# Patient Record
Sex: FEMALE | Race: WHITE | Employment: UNEMPLOYED | URBAN - METROPOLITAN AREA
[De-identification: names, ages, dates, MRNs, and addresses within clinical notes are randomized per-mention and may not be internally consistent; named-entity substitution may affect disease eponyms.]

---

## 2022-12-07 ENCOUNTER — HOSPITAL ENCOUNTER (EMERGENCY)
Age: 22
Discharge: HOME | End: 2022-12-07
Attending: EMERGENCY MEDICINE
Payer: COMMERCIAL

## 2022-12-07 VITALS
WEIGHT: 143 LBS | HEART RATE: 112 BPM | DIASTOLIC BLOOD PRESSURE: 95 MMHG | OXYGEN SATURATION: 98 % | TEMPERATURE: 99.3 F | HEIGHT: 62 IN | SYSTOLIC BLOOD PRESSURE: 121 MMHG | BODY MASS INDEX: 26.31 KG/M2 | RESPIRATION RATE: 16 BRPM

## 2022-12-07 DIAGNOSIS — T40.601A OPIATE OVERDOSE, ACCIDENTAL OR UNINTENTIONAL, INITIAL ENCOUNTER (HCC): Primary | ICD-10-CM

## 2022-12-07 LAB
ANION GAP SERPL CALCULATED.3IONS-SCNC: 13 MMOL/L (ref 3–16)
BASOPHILS ABSOLUTE: 0 K/UL (ref 0–0.2)
BASOPHILS RELATIVE PERCENT: 0.3 %
BUN BLDV-MCNC: 9 MG/DL (ref 7–20)
CALCIUM SERPL-MCNC: 10 MG/DL (ref 8.3–10.6)
CHLORIDE BLD-SCNC: 101 MMOL/L (ref 99–110)
CO2: 24 MMOL/L (ref 21–32)
CREAT SERPL-MCNC: 0.6 MG/DL (ref 0.6–1.1)
EOSINOPHILS ABSOLUTE: 0 K/UL (ref 0–0.6)
EOSINOPHILS RELATIVE PERCENT: 0.2 %
GFR SERPL CREATININE-BSD FRML MDRD: >60 ML/MIN/{1.73_M2}
GLUCOSE BLD-MCNC: 112 MG/DL (ref 70–99)
HCT VFR BLD CALC: 36.7 % (ref 36–48)
HEMOGLOBIN: 12.4 G/DL (ref 12–16)
LYMPHOCYTES ABSOLUTE: 2.1 K/UL (ref 1–5.1)
LYMPHOCYTES RELATIVE PERCENT: 22.4 %
MAGNESIUM: 2 MG/DL (ref 1.8–2.4)
MCH RBC QN AUTO: 29.6 PG (ref 26–34)
MCHC RBC AUTO-ENTMCNC: 33.7 G/DL (ref 31–36)
MCV RBC AUTO: 87.8 FL (ref 80–100)
MONOCYTES ABSOLUTE: 0.8 K/UL (ref 0–1.3)
MONOCYTES RELATIVE PERCENT: 8.8 %
NEUTROPHILS ABSOLUTE: 6.5 K/UL (ref 1.7–7.7)
NEUTROPHILS RELATIVE PERCENT: 68.3 %
PDW BLD-RTO: 15.2 % (ref 12.4–15.4)
PLATELET # BLD: 341 K/UL (ref 135–450)
PMV BLD AUTO: 8.1 FL (ref 5–10.5)
POTASSIUM REFLEX MAGNESIUM: 3.4 MMOL/L (ref 3.5–5.1)
RBC # BLD: 4.18 M/UL (ref 4–5.2)
SODIUM BLD-SCNC: 138 MMOL/L (ref 136–145)
WBC # BLD: 9.5 K/UL (ref 4–11)

## 2022-12-07 PROCEDURE — 2580000003 HC RX 258: Performed by: EMERGENCY MEDICINE

## 2022-12-07 PROCEDURE — 99284 EMERGENCY DEPT VISIT MOD MDM: CPT

## 2022-12-07 PROCEDURE — 36415 COLL VENOUS BLD VENIPUNCTURE: CPT

## 2022-12-07 PROCEDURE — 85025 COMPLETE CBC W/AUTO DIFF WBC: CPT

## 2022-12-07 PROCEDURE — 80048 BASIC METABOLIC PNL TOTAL CA: CPT

## 2022-12-07 PROCEDURE — 83735 ASSAY OF MAGNESIUM: CPT

## 2022-12-07 RX ORDER — 0.9 % SODIUM CHLORIDE 0.9 %
1000 INTRAVENOUS SOLUTION INTRAVENOUS ONCE
Status: COMPLETED | OUTPATIENT
Start: 2022-12-07 | End: 2022-12-07

## 2022-12-07 RX ADMIN — SODIUM CHLORIDE 1000 ML: 9 INJECTION, SOLUTION INTRAVENOUS at 16:04

## 2022-12-07 RX ADMIN — SODIUM CHLORIDE 1000 ML: 9 INJECTION, SOLUTION INTRAVENOUS at 18:06

## 2022-12-07 ASSESSMENT — PAIN - FUNCTIONAL ASSESSMENT: PAIN_FUNCTIONAL_ASSESSMENT: NONE - DENIES PAIN

## 2022-12-07 NOTE — ED NOTES
Writer received call from [employer] to receive information regarding patient. [place] stated that he had a release of information form and was to fax it over. Release of information form was not completely filled out and patient stated that she did not consent for any information to be given at this time. Release was meant for \"the [facility]\" facility only.  No information provided to deputy per patient request.     Alebrto Cheatham RN  12/07/22 6837

## 2022-12-07 NOTE — DISCHARGE INSTRUCTIONS
You are being discharged to [facility] for inpatient drug rehab. Your labwork was reassuring. Our heart rate was elevated here, but that is likely from the Methamphetamine you used earlier today. severe

## 2022-12-07 NOTE — ED PROVIDER NOTES
[facility] Emergency Department      CHIEF COMPLAINT  Drug Overdose (Pt was on her way to inpatient rehab and snorted fentanyl on the way. Was in intake and went unresponsive and / lips blue. Was given 2 dosed of 4 mg narcan IN. By the time squad arrived pt was alert and oriented. Pt arrives to ed alert and oriented.  )      HISTORY OF PRESENT ILLNESS  Yaquelin Demarco is a 25 y.o. female with a history of drug abuse presents after an accidental overdose. She states she was in intermediate last week and was court mandated to either do outpatient rehab with house arrest or an inpatient rehab program.  She was checking into the [facility] today for inpatient rehab. She states that she did some meth earlier today and then snorted some fentanyl on her way to the [facility]. While they were doing her intake she apparently became unresponsive and her lips turned blue. She was given 2 doses of 4 mg of intranasal Narcan and woke up and was alert and oriented. She was sent here for further evaluation. She denies any complaints currently. She states she realizes that if she had done this while she was at home that she would have . She states she does want to get sober. .   No other complaints, modifying factors or associated symptoms. I have reviewed the following from the nursing documentation. No past medical history on file. No past surgical history on file. No family history on file.   Social History     Socioeconomic History    Marital status:      Spouse name: Not on file    Number of children: Not on file    Years of education: Not on file    Highest education level: Not on file   Occupational History    Not on file   Tobacco Use    Smoking status: Not on file    Smokeless tobacco: Not on file   Substance and Sexual Activity    Alcohol use: Not on file    Drug use: Yes     Types: Opiates     Sexual activity: Not on file   Other Topics Concern    Not on file   Social History Narrative    Not on file     Social Determinants of Health     Financial Resource Strain: Not on file   Food Insecurity: Not on file   Transportation Needs: Not on file   Physical Activity: Not on file   Stress: Not on file   Social Connections: Not on file   Intimate Partner Violence: Not on file   Housing Stability: Not on file     No current facility-administered medications for this encounter. No current outpatient medications on file. No Known Allergies    REVIEW OF SYSTEMS      General:  No fevers  Eyes:  No recent vison changes  ENT:  No sore throat, no nasal congestion  Cardiovascular:  no palpitations  Respiratory:   no cough, no wheezing  Gastrointestinal:  No abdominal pain, no vomiting, no diarrhea  Musculoskeletal:  No muscle pain, no joint pain  Skin:  No rash   Neurologic:  No speech problems, no headache, no extremity numbness, no extremity weakness  Genitourinary:  No dysuria  Extremities:  no edema, no pain      Unless otherwise stated in this report, this patient's positive and negative responses for review of systems (constitutional, eyes, ENT, cardiovascular, respiratory, gastrointestinal, neurological, genitourinary, musculoskeletal, integument systems and systems related to the presenting problem) are either stated in the preceding paragraph, were not pertinent or were negative for the symptoms and/or complaints related to the medical problem. PHYSICAL EXAM  BP (!) 121/95   Pulse (!) 112   Temp 99.3 °F (37.4 °C) (Oral)   Resp 16   Ht 5' 2\" (1.575 m)   Wt 143 lb (64.9 kg)   SpO2 98%   BMI 26.16 kg/m²   GENERAL APPEARANCE: Awake and alert. Cooperative. No acute distress. HEAD: Normocephalic. Atraumatic. EYES: PERRL. EOM's grossly intact. ENT: Mucous membranes are moist.   NECK: Supple, trachea midline. HEART: Tachycardic, normal rhythm  LUNGS: Respirations unlabored. CTAB. Good air exchange. No wheezes, rales, or rhonchi. Speaking comfortably in full sentences. ABDOMEN: Soft. Non-distended. Non-tender. No guarding or rebound. EXTREMITIES: No peripheral edema. MAEE. No acute deformities. SKIN: Warm, dry and intact. No acute rashes. NEUROLOGICAL: Alert and oriented X 3. CN II-XII grossly intact. PSYCHIATRIC: Normal mood and affect. LABS  I have reviewed all labs for this visit. Results for orders placed or performed during the hospital encounter of 12/07/22   CBC with Auto Differential   Result Value Ref Range    WBC 9.5 4.0 - 11.0 K/uL    RBC 4.18 4.00 - 5.20 M/uL    Hemoglobin 12.4 12.0 - 16.0 g/dL    Hematocrit 36.7 36.0 - 48.0 %    MCV 87.8 80.0 - 100.0 fL    MCH 29.6 26.0 - 34.0 pg    MCHC 33.7 31.0 - 36.0 g/dL    RDW 15.2 12.4 - 15.4 %    Platelets 990 664 - 531 K/uL    MPV 8.1 5.0 - 10.5 fL    Neutrophils % 68.3 %    Lymphocytes % 22.4 %    Monocytes % 8.8 %    Eosinophils % 0.2 %    Basophils % 0.3 %    Neutrophils Absolute 6.5 1.7 - 7.7 K/uL    Lymphocytes Absolute 2.1 1.0 - 5.1 K/uL    Monocytes Absolute 0.8 0.0 - 1.3 K/uL    Eosinophils Absolute 0.0 0.0 - 0.6 K/uL    Basophils Absolute 0.0 0.0 - 0.2 K/uL   Basic Metabolic Panel w/ Reflex to MG   Result Value Ref Range    Sodium 138 136 - 145 mmol/L    Potassium reflex Magnesium 3.4 (L) 3.5 - 5.1 mmol/L    Chloride 101 99 - 110 mmol/L    CO2 24 21 - 32 mmol/L    Anion Gap 13 3 - 16    Glucose 112 (H) 70 - 99 mg/dL    BUN 9 7 - 20 mg/dL    Creatinine 0.6 0.6 - 1.1 mg/dL    Est, Glom Filt Rate >60 >60    Calcium 10.0 8.3 - 10.6 mg/dL   Magnesium   Result Value Ref Range    Magnesium 2.00 1.80 - 2.40 mg/dL       RADIOLOGY  X-RAYS: ALL IMAGES INCLUDING PLAIN FILMS, CT, ULTRASOUND AND MRI HAVE BEEN READ BY THE RADIOLOGIST. I have personally reviewed plain film images and have reviewed the radiology reports. No orders to display              Rechecks: Physical assessment performed. Patient was observed in the ER for over 4 hours. Heart rate did improve after IV fluids.   She remains awake and alert with no rebound somnolence. Sepsis:  Is this patient to be included in the SEP-1 Core Measure due to severe sepsis or septic shock? No   Exclusion criteria - the patient is NOT to be included for SEP-1 Core Measure due to: Alternative explanation for abnormal labs/vitals that do not relate to sepsis, see MDM for further explanation           ED COURSE/MDM  Patient seen and evaluated. Here the patient is afebrile with normal vitals signs, other than sinus tachycardia. Old records reviewed. Here she is asymptomatic. Awake and alert. Lab work all appears to be baseline. She was given IV fluids here and her heart rate did improve however it was still was greater than 100 at the time of discharge. She admits to methamphetamine use earlier this evening and I do suspect this is likely the source of her tachycardia. She is not having any complaints of abdominal pain or dysuria. She is afebrile with no leukocytosis. I do not think this tachycardia is a sign of sepsis. A staff member from the [facility] is here to pick the patient up and is taking her directly there for inpatient rehab. Labs and imaging reviewed and results discussed with patient. Patient was reassessed as noted above . Plan of care discussed with patient. Patient in agreement with plan. Strict return precautions have been given. Patient was given scripts for the following medications. I counseled patient how to take these medications. There are no discharge medications for this patient. No prescriptions given. CLINICAL IMPRESSION  1. Opiate overdose, accidental or unintentional, initial encounter ([facility] [address]        Blood pressure (!) 121/95, pulse (!) 112, temperature 99.3 °F (37.4 °C), temperature source Oral, resp. rate 16, height 5' 2\" (1.575 m), weight 143 lb (64.9 kg), SpO2 98 %. DISPOSITION  Miriam Strauss was discharged to inpatient rehab in stable condition. Shiela Beckham MD am the primary clinician of record.     (Please note this note was completed with a voice recognition program.  Efforts were made to edit the dictations but occasionally words are mis-transcribed.)        Rebecca Corbin MD  12/09/22 2122

## 2022-12-30 ENCOUNTER — HOSPITAL ENCOUNTER (EMERGENCY)
Age: 22
Discharge: HOME | End: 2022-12-30
Payer: COMMERCIAL

## 2022-12-30 VITALS
HEART RATE: 102 BPM | SYSTOLIC BLOOD PRESSURE: 126 MMHG | TEMPERATURE: 96 F | OXYGEN SATURATION: 98 % | DIASTOLIC BLOOD PRESSURE: 92 MMHG | RESPIRATION RATE: 18 BRPM

## 2022-12-30 DIAGNOSIS — R82.71 ASYMPTOMATIC BACTERIURIA: ICD-10-CM

## 2022-12-30 DIAGNOSIS — Z76.89 ENCOUNTER FOR ASSESSMENT OF STD EXPOSURE: Primary | ICD-10-CM

## 2022-12-30 LAB
BACTERIA WET PREP: NORMAL
BACTERIA: ABNORMAL /HPF
BILIRUBIN URINE: NEGATIVE
BLOOD, URINE: NEGATIVE
CLARITY: ABNORMAL
CLUE CELLS: NORMAL
COLOR: YELLOW
EPITHELIAL CELLS WET PREP: NORMAL
EPITHELIAL CELLS, UA: ABNORMAL /HPF (ref 0–5)
GLUCOSE URINE: NEGATIVE MG/DL
HCG(URINE) PREGNANCY TEST: NEGATIVE
KETONES, URINE: NEGATIVE MG/DL
LEUKOCYTE ESTERASE, URINE: ABNORMAL
MICROSCOPIC EXAMINATION: YES
NITRITE, URINE: NEGATIVE
PH UA: 6 (ref 5–8)
PROTEIN UA: NEGATIVE MG/DL
RBC UA: ABNORMAL /HPF (ref 0–4)
RBC WET PREP: NORMAL
SOURCE WET PREP: NORMAL
SPECIFIC GRAVITY UA: 1.02 (ref 1–1.03)
TRICHOMONAS PREP: NORMAL
URINE REFLEX TO CULTURE: YES
URINE TYPE: ABNORMAL
UROBILINOGEN, URINE: 0.2 E.U./DL
WBC UA: ABNORMAL /HPF (ref 0–5)
WBC WET PREP: NORMAL
YEAST WET PREP: NORMAL

## 2022-12-30 PROCEDURE — 84703 CHORIONIC GONADOTROPIN ASSAY: CPT

## 2022-12-30 PROCEDURE — 87591 N.GONORRHOEAE DNA AMP PROB: CPT

## 2022-12-30 PROCEDURE — 87491 CHLMYD TRACH DNA AMP PROBE: CPT

## 2022-12-30 PROCEDURE — 87210 SMEAR WET MOUNT SALINE/INK: CPT

## 2022-12-30 PROCEDURE — 81001 URINALYSIS AUTO W/SCOPE: CPT

## 2022-12-30 PROCEDURE — 99283 EMERGENCY DEPT VISIT LOW MDM: CPT

## 2022-12-30 PROCEDURE — 87086 URINE CULTURE/COLONY COUNT: CPT

## 2022-12-30 ASSESSMENT — PAIN - FUNCTIONAL ASSESSMENT: PAIN_FUNCTIONAL_ASSESSMENT: NONE - DENIES PAIN

## 2022-12-30 NOTE — ED PROVIDER NOTES
[address] ED  EMERGENCY DEPARTMENT ENCOUNTER        Pt Name: Ramona Damon  MRN: [id number]  [place] 2000  Date of evaluation: 12/30/2022  Provider: PATRICIA Cornejo  PCP: No primary care provider on file. Note Started: 5:04 PM EST 12/30/22      CHEY. I have evaluated this patient. My supervising physician was available for consultation. CHIEF COMPLAINT       Chief Complaint   Patient presents with    Exposure to STD     Pt reports she tested positive for syphilis x6 mo ago, pt unsure about results but reports now ex-partner is positive as well. Reports irregular vaginal bleeding, cramping x5 mo. HISTORY OF PRESENT ILLNESS: 1 or more Elements     History from : Patient        Ramona Damon is a 25 y.o. female who presents from her rehab facility for evaluation of exposure to STD. Patient notes that about 6 months ago she was at a needle exchange program and she tested positive on blood test for syphilis. Patient notes that she just learned that her ex partner is currently in the hospital for syphilis. She denies any vaginal lesions she denies dysuria hematuria frequency urgency. She endorses intermittent periods and cramping for the last 5 months, not currently on her. Not currently with abdominal cramping. She denies concern for pregnancy. She has not recently been sexually active denies any specific concern for chlamydia or gonorrhea. She is requesting HIV testing. She is not currently using IV drugs, she is in a rehab facility for this. When she tested positive for syphilis she was told to follow-up with the health department she never did. She does not have a family doctor. She denies any fevers body aches chills nausea she has no other acute concerns or complaints at this time. Nursing Notes were all reviewed and agreed with or any disagreements were addressed in the HPI.     REVIEW OF SYSTEMS :      Review of Systems    Positives and Pertinent negatives as per HPI. SURGICAL HISTORY   No past surgical history on file. CURRENTMEDICATIONS       There are no discharge medications for this patient. ALLERGIES     Patient has no known allergies. FAMILYHISTORY     No family history on file. SOCIAL HISTORY       Social History     Substance Use Topics    Drug use: Yes     Types: Opiates        SCREENINGS        [place] Coma Scale  Eye Opening: Spontaneous  Best Verbal Response: Oriented  Best Motor Response: Obeys commands  [place] Coma Scale Score: 15                CIWA Assessment  BP: (!) 126/92  Heart Rate: (!) 102           PHYSICAL EXAM  1 or more Elements     ED Triage Vitals   BP Temp Temp Source Heart Rate Resp SpO2 Height Weight   12/30/22 1535 12/30/22 1532 12/30/22 1532 12/30/22 1535 12/30/22 1532 12/30/22 1532 -- --   (!) 132/94 (!) 96 °F (35.6 °C) Oral (!) 109 14 99 %         Physical Exam  Vitals and nursing note reviewed. Exam conducted with a chaperone present. Constitutional:       General: She is not in acute distress. Appearance: She is well-developed. She is not ill-appearing, toxic-appearing or diaphoretic. HENT:      Head: Normocephalic and atraumatic. Right Ear: External ear normal.      Left Ear: External ear normal.      Nose: Nose normal.      Mouth/Throat:      Mouth: Mucous membranes are moist.      Pharynx: Oropharynx is clear. Comments: Oral mucosal lesions  Eyes:      General:         Right eye: No discharge. Left eye: No discharge. Extraocular Movements: Extraocular movements intact. Conjunctiva/sclera: Conjunctivae normal.      Pupils: Pupils are equal, round, and reactive to light. Cardiovascular:      Rate and Rhythm: Regular rhythm. Tachycardia present. Pulses: Normal pulses. Heart sounds: Normal heart sounds. No murmur heard. No friction rub. No gallop. Pulmonary:      Effort: Pulmonary effort is normal. No respiratory distress.       Breath sounds: Normal breath sounds. No wheezing or rales. Abdominal:      General: Abdomen is flat. There is no distension. Palpations: Abdomen is soft. Tenderness: There is no abdominal tenderness. There is no guarding or rebound. Genitourinary:     General: Normal vulva. Vagina: Normal.      Cervix: Erythema present. No cervical motion tenderness, discharge, friability, lesion or cervical bleeding. Uterus: Normal.       Adnexa: Right adnexa normal and left adnexa normal.      Comments: No ulcerations or lesions to genitalia   Musculoskeletal:      Cervical back: Normal range of motion and neck supple. Skin:     General: Skin is warm and dry. Capillary Refill: Capillary refill takes less than 2 seconds. Neurological:      General: No focal deficit present. Mental Status: She is alert and oriented to person, place, and time. Psychiatric:         Behavior: Behavior normal.           DIAGNOSTIC RESULTS   LABS:    Labs Reviewed   URINALYSIS WITH REFLEX TO CULTURE - Abnormal; Notable for the following components:       Result Value    Clarity, UA SL CLOUDY (*)     Leukocyte Esterase, Urine SMALL (*)     All other components within normal limits   MICROSCOPIC URINALYSIS - Abnormal; Notable for the following components:    WBC, UA 10-20 (*)     Epithelial Cells, UA 11-20 (*)     Bacteria, UA 2+ (*)     All other components within normal limits   WET PREP, GENITAL   CULTURE, URINE    Narrative:     ORDER#: [id number]                          ORDERED BY: Brenda Goodson  SOURCE: Urine Clean Catch                  COLLECTED:  12/30/22 16:27  ANTIBIOTICS AT SUGAR.:                      RECEIVED :  12/30/22 16:57   C. TRACHOMATIS N.GONORRHOEAE DNA   PREGNANCY, URINE       When ordered only abnormal lab results are displayed. All other labs were within normal range or not returned as of this dictation. EKG:  When ordered, EKG's are interpreted by the Emergency Department Physician in the absence of a cardiologist.  Please see their note for interpretation of EKG. RADIOLOGY:   Non-plain film images such as CT, Ultrasound and MRI are read by the radiologist. Plain radiographic images are visualized and preliminarily interpreted by the ED Provider with the below findings:        Interpretation per the Radiologist below, if available at the time of this note:    No orders to display     No results found. No results found. PROCEDURES   Unless otherwise noted below, none     Procedures    CRITICAL CARE TIME (.cctime)         PAST MEDICAL HISTORY      has no past medical history on file. Chronic Conditions affecting Care:     EMERGENCY DEPARTMENT COURSE and DIFFERENTIAL DIAGNOSIS/MDM:   Vitals:    Vitals:    12/30/22 1532 12/30/22 1535 12/30/22 1716   BP:  (!) 132/94 (!) 126/92   Pulse:  (!) 109 (!) 102   Resp: 14  18   Temp: (!) 96 °F (35.6 °C)     TempSrc: Oral     SpO2: 99%  98%       Patient was given the following medications:  Medications - No data to display          Is this patient to be included in the SEP-1 Core Measure due to severe sepsis or septic shock? No   Exclusion criteria - the patient is NOT to be included for SEP-1 Core Measure due to: Infection is not suspected    CONSULTS: (Who and What was discussed)  None              CC/HPI Summary, DDx, ED Course, and Reassessment: Patient seen and evaluated. Old records reviewed. Diagnostic testing reviewed and results discussed. I have independently evaluated this patient based upon my scope of practice. Supervising physician was in the department for consultation as needed. 63-year-old female presents for encounter for exposure for STD. Patient seen and evaluated by myself, exam is notable for overall well-appearing nontoxic adult female, vital signs are stable in the department, she is initially mildly tachycardic however this improves without any acute intervention throughout her ER stay patient notes that she is nervous.   I believe this is likely a mild whitecoat coat tachycardia. Otherwise she is stable. Abdomen is soft nontender. She had pelvic exam in the department which is negative for sign of any cankers ulcerations no mucosal lesions to the genitals, I am unable to swab anything for acute syphilis infection. Urinalysis was obtained there is mild pyuria present, scant bacteria however there is contamination with epithelial skin cells. Patient was offered oral antibiotic for presumptive UTI however she does not have any symptoms and she elected to wait for urine culture to see if she needs an antibiotic and I believe that this is reasonable. hCG negative. Wet prep was obtained which is unremarkable. Chlamydia gonorrhea is pending, patient deferred prophylactic antibiotics noting that she will wait she will be treated only if her test is positive. Patient will be given outpatient referral to HIV and STD testing centers. She will also be given outpatient referral to PCP and gynecology. If she develops any acute symptoms she was encouraged to return to the emergency room. At this time I believe she is reasonable candidate for outpatient management and strict ER return precautions were advised. Disposition Considerations (include 1 Tests not done, Shared Decision Making, Pt Expectation of Test or Tx.):   Appropriate for outpatient management    Pt is in agreement with the current plan and all questions were addressed. I am the Primary Clinician of Record. FINAL IMPRESSION      1. Encounter for assessment of STD exposure    2.  Asymptomatic bacteriuria          DISPOSITION/PLAN     DISPOSITION Decision To Discharge 12/30/2022 06:49:02 PM      PATIENT REFERRED TO:  [facility] Pre-Services  [phone]  Schedule an appointment as soon as possible for a visit       Lenora Rodriguez, [address] [place] [address], [address] [place] [address]  [phone]    Schedule an appointment as soon as possible for a visit       DISCHARGE MEDICATIONS:  There are no discharge medications for this patient. DISCONTINUED MEDICATIONS:  There are no discharge medications for this patient.              (Please note that portions of this note were completed with a voice recognition program.  Efforts were made to edit the dictations but occasionally words are mis-transcribed.)    Joel Purvis (electronically signed)           Joel Purvis  12/31/22 1900

## 2022-12-30 NOTE — DISCHARGE INSTRUCTIONS
For Culture Results  Call Medical Records at  21 [phone]  3-5 days after your visit. You must have picture I.D. To obtain results.       FOR MORE INFORMATION ABOUT STDS, CONTACT YOUR PHYSICIAN OR:    [facility]                             [address]  [facility], [address]  [place] [address]                  [phone]    Sexually [facility] - [address]                   [place], [address]  [place] [address] [address]                  [phone]    [facility]  [address] [facility] [address]                   [address] [place] [address]  [place]                                         [place] [address]  [phone]               STD Checks  [phone]                                                                        [place]   For an appointment                                                        [address] [place] [address]  Monday [address]   Thursday 8                     [phone]               [facility]    Organization Address:  [address] [place] [address]  Phone [phone] (Main Phone Number)  [phone] (Domestic Fax)  Hours:  Monday, 9:00am To 3:00pm  Tuesday, 9:00am To 3:00pm  Wednesday, 9:00am To 3:00pm  Thursday, 9:00am To 3:00pm  Friday, 9:00am To 3:00pm    Appointment Required:No  Services:  Please contact organization for eligibility requirements  On-Site Testing Services  HPV Vaccine  Hepatitis A Vaccine  Hepatitis B Vaccine  Hepatitis C Test  Rapid HIV Test    Fee Information:  Fee  Free HIV Testing  Free Hepatitis C Testing  Insurance Accepted  Low Cost  Medicaid Accepted  PrEP for uninsured    [facility]   Location  [facility] [address] [place] [id number]    [phone]  STD Full Panel  A full STD panel can give you a comprehensive view of your sexual health. Its great for people without symptoms who may have had an exposure incident to stay safe.

## 2022-12-31 LAB — URINE CULTURE, ROUTINE: NORMAL

## 2023-01-03 LAB
C TRACH DNA GENITAL QL NAA+PROBE: NEGATIVE
N. GONORRHOEAE DNA: NEGATIVE